# Patient Record
Sex: MALE | HISPANIC OR LATINO | Employment: OTHER | URBAN - NONMETROPOLITAN AREA
[De-identification: names, ages, dates, MRNs, and addresses within clinical notes are randomized per-mention and may not be internally consistent; named-entity substitution may affect disease eponyms.]

---

## 2017-12-28 ENCOUNTER — HOSPITAL ENCOUNTER (EMERGENCY)
Facility: HOSPITAL | Age: 57
Discharge: HOME | End: 2017-12-28
Attending: EMERGENCY MEDICINE | Admitting: EMERGENCY MEDICINE

## 2017-12-28 ENCOUNTER — APPOINTMENT (OUTPATIENT)
Dept: GENERAL RADIOLOGY | Facility: HOSPITAL | Age: 57
End: 2017-12-28

## 2017-12-28 ENCOUNTER — APPOINTMENT (OUTPATIENT)
Dept: CT IMAGING | Facility: HOSPITAL | Age: 57
End: 2017-12-28

## 2017-12-28 VITALS
WEIGHT: 235 LBS | OXYGEN SATURATION: 89 % | BODY MASS INDEX: 36.88 KG/M2 | RESPIRATION RATE: 20 BRPM | SYSTOLIC BLOOD PRESSURE: 144 MMHG | TEMPERATURE: 98.6 F | HEART RATE: 94 BPM | HEIGHT: 67 IN | DIASTOLIC BLOOD PRESSURE: 68 MMHG

## 2017-12-28 DIAGNOSIS — K43.9 ABDOMINAL WALL HERNIA: ICD-10-CM

## 2017-12-28 DIAGNOSIS — K94.09 COLOSTOMY PROLAPSE (HCC): Primary | ICD-10-CM

## 2017-12-28 LAB
ALBUMIN SERPL-MCNC: 3.8 G/DL (ref 3.4–4.8)
ALBUMIN/GLOB SERPL: 1.1 G/DL (ref 1.1–1.8)
ALP SERPL-CCNC: 93 U/L (ref 38–126)
ALT SERPL W P-5'-P-CCNC: 36 U/L (ref 21–72)
ANION GAP SERPL CALCULATED.3IONS-SCNC: 10 MMOL/L (ref 5–15)
AST SERPL-CCNC: 33 U/L (ref 17–59)
BASOPHILS # BLD AUTO: 0.01 10*3/MM3 (ref 0–0.2)
BASOPHILS NFR BLD AUTO: 0.2 % (ref 0–2)
BILIRUB SERPL-MCNC: 0.4 MG/DL (ref 0.2–1.3)
BILIRUB UR QL STRIP: NEGATIVE
BUN BLD-MCNC: 11 MG/DL (ref 7–21)
BUN/CREAT SERPL: 16.4 (ref 7–25)
CALCIUM SPEC-SCNC: 9 MG/DL (ref 8.4–10.2)
CHLORIDE SERPL-SCNC: 101 MMOL/L (ref 95–110)
CLARITY UR: CLEAR
CO2 SERPL-SCNC: 28 MMOL/L (ref 22–31)
COLOR UR: YELLOW
CREAT BLD-MCNC: 0.67 MG/DL (ref 0.7–1.3)
DEPRECATED RDW RBC AUTO: 49 FL (ref 35.1–43.9)
EOSINOPHIL # BLD AUTO: 0.09 10*3/MM3 (ref 0–0.7)
EOSINOPHIL NFR BLD AUTO: 2 % (ref 0–7)
ERYTHROCYTE [DISTWIDTH] IN BLOOD BY AUTOMATED COUNT: 15.1 % (ref 11.5–14.5)
GFR SERPL CREATININE-BSD FRML MDRD: 123 ML/MIN/1.73 (ref 56–130)
GFR SERPL CREATININE-BSD FRML MDRD: 149 ML/MIN/1.73 (ref 56–130)
GLOBULIN UR ELPH-MCNC: 3.5 GM/DL (ref 2.3–3.5)
GLUCOSE BLD-MCNC: 163 MG/DL (ref 60–100)
GLUCOSE UR STRIP-MCNC: NEGATIVE MG/DL
HCT VFR BLD AUTO: 30.9 % (ref 39–49)
HGB BLD-MCNC: 9.9 G/DL (ref 13.7–17.3)
HGB UR QL STRIP.AUTO: NEGATIVE
IMM GRANULOCYTES # BLD: 0.01 10*3/MM3 (ref 0–0.02)
IMM GRANULOCYTES NFR BLD: 0.2 % (ref 0–0.5)
KETONES UR QL STRIP: NEGATIVE
LEUKOCYTE ESTERASE UR QL STRIP.AUTO: NEGATIVE
LIPASE SERPL-CCNC: 50 U/L (ref 23–300)
LYMPHOCYTES # BLD AUTO: 1.65 10*3/MM3 (ref 0.6–4.2)
LYMPHOCYTES NFR BLD AUTO: 36.3 % (ref 10–50)
MAGNESIUM SERPL-MCNC: 1.7 MG/DL (ref 1.6–2.3)
MCH RBC QN AUTO: 28.4 PG (ref 26.5–34)
MCHC RBC AUTO-ENTMCNC: 32 G/DL (ref 31.5–36.3)
MCV RBC AUTO: 88.5 FL (ref 80–98)
MONOCYTES # BLD AUTO: 0.34 10*3/MM3 (ref 0–0.9)
MONOCYTES NFR BLD AUTO: 7.5 % (ref 0–12)
NEUTROPHILS # BLD AUTO: 2.45 10*3/MM3 (ref 2–8.6)
NEUTROPHILS NFR BLD AUTO: 53.8 % (ref 37–80)
NITRITE UR QL STRIP: NEGATIVE
PH UR STRIP.AUTO: 6.5 [PH] (ref 5–9)
PLATELET # BLD AUTO: 282 10*3/MM3 (ref 150–450)
PMV BLD AUTO: 10.1 FL (ref 8–12)
POTASSIUM BLD-SCNC: 3.9 MMOL/L (ref 3.5–5.1)
POTASSIUM BLD-SCNC: 4.5 MMOL/L (ref 3.5–5.1)
PROT SERPL-MCNC: 7.3 G/DL (ref 6.3–8.6)
PROT UR QL STRIP: NEGATIVE
RBC # BLD AUTO: 3.49 10*6/MM3 (ref 4.37–5.74)
SODIUM BLD-SCNC: 139 MMOL/L (ref 137–145)
SP GR UR STRIP: 1.01 (ref 1–1.03)
UROBILINOGEN UR QL STRIP: NORMAL
WBC NRBC COR # BLD: 4.55 10*3/MM3 (ref 3.2–9.8)

## 2017-12-28 PROCEDURE — 25010000002 ONDANSETRON PER 1 MG: Performed by: EMERGENCY MEDICINE

## 2017-12-28 PROCEDURE — 83690 ASSAY OF LIPASE: CPT | Performed by: EMERGENCY MEDICINE

## 2017-12-28 PROCEDURE — 0 IOPAMIDOL 61 % SOLUTION: Performed by: EMERGENCY MEDICINE

## 2017-12-28 PROCEDURE — 85025 COMPLETE CBC W/AUTO DIFF WBC: CPT | Performed by: EMERGENCY MEDICINE

## 2017-12-28 PROCEDURE — 84132 ASSAY OF SERUM POTASSIUM: CPT | Performed by: EMERGENCY MEDICINE

## 2017-12-28 PROCEDURE — 99284 EMERGENCY DEPT VISIT MOD MDM: CPT

## 2017-12-28 PROCEDURE — 83735 ASSAY OF MAGNESIUM: CPT | Performed by: EMERGENCY MEDICINE

## 2017-12-28 PROCEDURE — 96374 THER/PROPH/DIAG INJ IV PUSH: CPT

## 2017-12-28 PROCEDURE — 96376 TX/PRO/DX INJ SAME DRUG ADON: CPT

## 2017-12-28 PROCEDURE — 81003 URINALYSIS AUTO W/O SCOPE: CPT | Performed by: EMERGENCY MEDICINE

## 2017-12-28 PROCEDURE — 94640 AIRWAY INHALATION TREATMENT: CPT

## 2017-12-28 PROCEDURE — 96361 HYDRATE IV INFUSION ADD-ON: CPT

## 2017-12-28 PROCEDURE — 80053 COMPREHEN METABOLIC PANEL: CPT | Performed by: EMERGENCY MEDICINE

## 2017-12-28 PROCEDURE — 96375 TX/PRO/DX INJ NEW DRUG ADDON: CPT

## 2017-12-28 PROCEDURE — 74177 CT ABD & PELVIS W/CONTRAST: CPT

## 2017-12-28 PROCEDURE — 0 DIATRIZOATE MEGLUMINE & SODIUM PER 1 ML: Performed by: EMERGENCY MEDICINE

## 2017-12-28 PROCEDURE — 74022 RADEX COMPL AQT ABD SERIES: CPT

## 2017-12-28 PROCEDURE — 82962 GLUCOSE BLOOD TEST: CPT

## 2017-12-28 RX ORDER — INSULIN GLARGINE 100 [IU]/ML
15 INJECTION, SOLUTION SUBCUTANEOUS 2 TIMES DAILY
COMMUNITY

## 2017-12-28 RX ORDER — FLUTICASONE PROPIONATE 50 MCG
2 SPRAY, SUSPENSION (ML) NASAL DAILY
COMMUNITY

## 2017-12-28 RX ORDER — ALBUTEROL SULFATE 90 UG/1
2 AEROSOL, METERED RESPIRATORY (INHALATION) EVERY 4 HOURS PRN
COMMUNITY

## 2017-12-28 RX ORDER — PANTOPRAZOLE SODIUM 40 MG/1
40 TABLET, DELAYED RELEASE ORAL DAILY
COMMUNITY

## 2017-12-28 RX ORDER — TRAZODONE HYDROCHLORIDE 50 MG/1
50 TABLET ORAL NIGHTLY
COMMUNITY

## 2017-12-28 RX ORDER — ACETAMINOPHEN 325 MG/1
TABLET ORAL
Status: COMPLETED
Start: 2017-12-28 | End: 2017-12-28

## 2017-12-28 RX ORDER — ONDANSETRON 2 MG/ML
4 INJECTION INTRAMUSCULAR; INTRAVENOUS ONCE
Status: COMPLETED | OUTPATIENT
Start: 2017-12-28 | End: 2017-12-28

## 2017-12-28 RX ORDER — GABAPENTIN 800 MG/1
900 TABLET ORAL 3 TIMES DAILY
COMMUNITY

## 2017-12-28 RX ORDER — ACETAMINOPHEN 325 MG/1
650 TABLET ORAL ONCE
Status: COMPLETED | OUTPATIENT
Start: 2017-12-28 | End: 2017-12-28

## 2017-12-28 RX ORDER — SODIUM CHLORIDE 9 MG/ML
125 INJECTION, SOLUTION INTRAVENOUS CONTINUOUS
Status: DISCONTINUED | OUTPATIENT
Start: 2017-12-28 | End: 2017-12-29 | Stop reason: HOSPADM

## 2017-12-28 RX ORDER — OXYCODONE AND ACETAMINOPHEN 10; 325 MG/1; MG/1
1 TABLET ORAL EVERY 6 HOURS PRN
COMMUNITY

## 2017-12-28 RX ORDER — SUCRALFATE 1 G/1
1 TABLET ORAL 4 TIMES DAILY
COMMUNITY

## 2017-12-28 RX ORDER — LISINOPRIL 10 MG/1
10 TABLET ORAL DAILY
COMMUNITY

## 2017-12-28 RX ORDER — LOPERAMIDE HYDROCHLORIDE 2 MG/1
2 CAPSULE ORAL 4 TIMES DAILY PRN
COMMUNITY

## 2017-12-28 RX ORDER — MAGNESIUM SULFATE HEPTAHYDRATE 40 MG/ML
2 INJECTION, SOLUTION INTRAVENOUS ONCE
Status: DISCONTINUED | OUTPATIENT
Start: 2017-12-28 | End: 2017-12-28

## 2017-12-28 RX ORDER — IPRATROPIUM BROMIDE AND ALBUTEROL SULFATE 2.5; .5 MG/3ML; MG/3ML
3 SOLUTION RESPIRATORY (INHALATION) ONCE
Status: COMPLETED | OUTPATIENT
Start: 2017-12-28 | End: 2017-12-28

## 2017-12-28 RX ADMIN — SODIUM CHLORIDE 500 ML: 9 INJECTION, SOLUTION INTRAVENOUS at 18:35

## 2017-12-28 RX ADMIN — IPRATROPIUM BROMIDE AND ALBUTEROL SULFATE 3 ML: .5; 3 SOLUTION RESPIRATORY (INHALATION) at 17:07

## 2017-12-28 RX ADMIN — IOPAMIDOL 94 ML: 612 INJECTION, SOLUTION INTRAVENOUS at 20:25

## 2017-12-28 RX ADMIN — ACETAMINOPHEN 650 MG: 325 TABLET ORAL at 21:40

## 2017-12-28 RX ADMIN — ONDANSETRON 4 MG: 2 INJECTION INTRAMUSCULAR; INTRAVENOUS at 17:03

## 2017-12-28 RX ADMIN — DIATRIZOATE MEGLUMINE AND DIATRIZOATE SODIUM 40 ML: 660; 100 LIQUID ORAL; RECTAL at 18:09

## 2017-12-28 RX ADMIN — HYDROMORPHONE HYDROCHLORIDE 0.5 MG: 10 INJECTION, SOLUTION INTRAMUSCULAR; INTRAVENOUS; SUBCUTANEOUS at 21:29

## 2017-12-28 RX ADMIN — HYDROMORPHONE HYDROCHLORIDE 0.5 MG: 10 INJECTION, SOLUTION INTRAMUSCULAR; INTRAVENOUS; SUBCUTANEOUS at 17:03

## 2017-12-28 RX ADMIN — SODIUM CHLORIDE 125 ML/HR: 9 INJECTION, SOLUTION INTRAVENOUS at 17:03

## 2017-12-29 ENCOUNTER — TELEPHONE (OUTPATIENT)
Dept: FAMILY MEDICINE CLINIC | Facility: CLINIC | Age: 57
End: 2017-12-29

## 2017-12-29 LAB — GLUCOSE BLDC GLUCOMTR-MCNC: 105 MG/DL (ref 70–130)

## 2018-01-05 ENCOUNTER — OFFICE VISIT (OUTPATIENT)
Dept: SURGERY | Facility: CLINIC | Age: 58
End: 2018-01-05

## 2018-01-05 VITALS
HEIGHT: 67 IN | SYSTOLIC BLOOD PRESSURE: 134 MMHG | WEIGHT: 233 LBS | DIASTOLIC BLOOD PRESSURE: 74 MMHG | BODY MASS INDEX: 36.57 KG/M2

## 2018-01-05 DIAGNOSIS — K43.5 PARASTOMAL HERNIA WITHOUT OBSTRUCTION OR GANGRENE: ICD-10-CM

## 2018-01-05 DIAGNOSIS — K43.2 INCISIONAL HERNIA, WITHOUT OBSTRUCTION OR GANGRENE: ICD-10-CM

## 2018-01-05 DIAGNOSIS — C19 COLORECTAL CANCER (HCC): Primary | ICD-10-CM

## 2018-01-05 PROCEDURE — 99214 OFFICE O/P EST MOD 30 MIN: CPT | Performed by: SURGERY

## 2018-01-05 RX ORDER — CLONAZEPAM 2 MG/1
TABLET ORAL
Refills: 1 | COMMUNITY
Start: 2017-12-30

## 2018-01-05 NOTE — PROGRESS NOTES
Subjective   Pierre Pena is a 57 y.o. male     History of Present Illness patient's here in follow-up after I saw him in the emergency room for concern about a herniated loop ostomy.  We were able to reduce the hernia in the emergency room and there is no evidence of any injury incarceration or obstruction.  Patient has a very complicated course and after my interaction with emergency room he had some type of surgery urgently in [place] in November of this past year.  Today its unclear if that was for a bowel obstruction due to cancer or if that was surgery on a liver met.  The patient and his daughter are not able to tell me and there is no paperwork available to explain what happened.  Patient had a CT scan done at her emergency room which demonstrates a hernia at the stoma was small bowel present but again no obvious obstruction present and also has a hernia in the right lower abdomen that is a site of a prior colostomy per the patient and his daughter.  The patient tells me that they removed his tumor from his colon in that they also removed tumor from his liver.  He has a mass in his liver that is either resolving hematoma or metastatic disease no other evidence of metastatic disease.  Patient was on chemotherapy prior to coming here from [place].  I asked the patient last saw him in the emergency room and his family to bring any paperwork with him if he sees any physician to include, and the see me.  They brought a disc of the CT scan was done in November in [place] and a discharge summary which does not explain any type of surgery that the patient had.  The diagnosis are colorectal cancer and liver metastases according to this report.  Patient's main complaint is pain and he points to his entire anterior abdominal wall.  He says that he is not able to eat because of this.  He had been getting his pain prescriptions from [place] but is not able to get those anymore.  After seeing me in the emergency room  he did find a primary care provider near [place] where he lives with his family.  According to the patient and his daughter this physician is going get him set up to see a medical oncologist locally patient tells me that he has a folder about his medical care but did not bring it to this appointment.  Daughter served as  as this patient does not speak English she speaks excellent English and Dutch        Review of Systems   Constitutional: Positive for appetite change.   HENT: Positive for nosebleeds.    Eyes: Negative.    Respiratory: Positive for cough, choking and shortness of breath.    Cardiovascular: Positive for palpitations and leg swelling.   Gastrointestinal: Positive for abdominal pain, blood in stool and diarrhea.   Endocrine: Negative.    Genitourinary: Positive for difficulty urinating.   Musculoskeletal: Positive for back pain.   Skin: Negative.    Allergic/Immunologic: Negative.    Neurological: Positive for dizziness and numbness.        Numbness & Tingling Bilateral Hands,Legs and Feet   Hematological: Negative.    Psychiatric/Behavioral:        Mood Changes,Depression     Past Medical History:   Diagnosis Date   • Cardiac defibrillator in place    • Colon cancer    • COPD (chronic obstructive pulmonary disease)    • Diabetes mellitus    • GERD (gastroesophageal reflux disease)    • H/O hernia repair    • Hypertension    • Insomnia    • Kidney stone    • Liver cancer    • Pacemaker    • Sleep apnea      Past Surgical History:   Procedure Laterality Date   • COLON SURGERY     • LIVER LOBECTOMY     • TRACHEOSTOMY       No family history on file.  Social History     Social History   • Marital status:      Spouse name: N/A   • Number of children: N/A   • Years of education: N/A     Occupational History   • Not on file.     Social History Main Topics   • Smoking status: Never Smoker   • Smokeless tobacco: Never Used   • Alcohol use No   • Drug use: No   • Sexual activity: Defer      Other Topics Concern   • Not on file     Social History Narrative     Allergies   Allergen Reactions   • Penicillins        Home Medications:  Prior to Admission medications    Medication Sig Start Date End Date Taking? Authorizing Provider   albuterol (PROVENTIL HFA;VENTOLIN HFA) 108 (90 Base) MCG/ACT inhaler Inhale 2 puffs Every 4 (Four) Hours As Needed for Wheezing.   Yes Historical Provider, MD   clonazePAM (KlonoPIN) 2 MG tablet TAKE 1 TABLET PO HS 12/30/17  Yes Historical Provider, MD   exenatide (BYETTA 10 MCG PEN) 10 MCG/0.04ML injection Inject 10 mcg under the skin 2 (Two) Times a Day.   Yes Historical Provider, MD   fluticasone (FLONASE) 50 MCG/ACT nasal spray 2 sprays into each nostril Daily.   Yes Historical Provider, MD   fluticasone-salmeterol (ADVAIR) 100-50 MCG/DOSE DISKUS Inhale 2 (Two) Times a Day.   Yes Historical Provider, MD   gabapentin (NEURONTIN) 800 MG tablet Take 900 mg by mouth 3 (Three) Times a Day.   Yes Historical Provider, MD   insulin glargine (LANTUS) 100 UNIT/ML injection Inject 15 Units under the skin 2 (Two) Times a Day.   Yes Historical Provider, MD   insulin lispro protamine-insulin lispro (humaLOG 50-50) (50-50) 100 UNIT/ML suspension injection Inject  under the skin 2 (Two) Times a Day With Meals. Unknown if 50/50 or 75/25   Yes Historical Provider, MD   lisinopril (PRINIVIL,ZESTRIL) 10 MG tablet Take 10 mg by mouth Daily.   Yes Historical Provider, MD   loperamide (IMODIUM) 2 MG capsule Take 2 mg by mouth 4 (Four) Times a Day As Needed for Diarrhea.   Yes Historical Provider, MD   metFORMIN (GLUCOPHAGE) 500 MG tablet Take 500 mg by mouth 2 (Two) Times a Day With Meals.   Yes Historical Provider, MD   metoprolol tartrate (LOPRESSOR) 25 MG tablet Take 25 mg by mouth 2 (Two) Times a Day.   Yes Historical Provider, MD   oxyCODONE-acetaminophen (PERCOCET)  MG per tablet Take 1 tablet by mouth Every 6 (Six) Hours As Needed for Moderate Pain .   Yes Historical Provider, MD    pantoprazole (PROTONIX) 40 MG EC tablet Take 40 mg by mouth Daily.   Yes Historical Provider, MD   sucralfate (CARAFATE) 1 g tablet Take 1 g by mouth 4 (Four) Times a Day.   Yes Historical Provider, MD   tiotropium (SPIRIVA) 18 MCG per inhalation capsule Place 1 capsule into inhaler and inhale Daily.   Yes Historical Provider, MD   traZODone (DESYREL) 50 MG tablet Take 50 mg by mouth Every Night.   Yes Historical Provider, MD       Objective   Physical Exam   Constitutional: No distress.   HENT:   Head: Normocephalic and atraumatic.   Eyes: EOM are normal. Pupils are equal, round, and reactive to light. No scleral icterus.   Neck: Normal range of motion. No tracheal deviation present.   Cardiovascular: Normal rate and regular rhythm.    Pulmonary/Chest: Effort normal and breath sounds normal. No stridor.   Abdominal: Soft. Bowel sounds are normal. He exhibits no distension. There is no guarding. A hernia is present.   Skin: Skin is warm and dry.    morbidly obese male with a parastomal hernia the table to be reduced and a hernia to the right of midline in the lower aspect of his abdomen that it too is able to be reduced well-healed scar overlying the second hernia.  Ostomy is pink and clearly working    Assessment/Plan parastomal hernia and a second separate hernia his anterior abdominal wall with likely metastatic colorectal cancer.  Again I explained to the patient and his family the complexity of his situation and the fact that in order for us to even attempt to sort this out we need information from his previous care to include what operations he had with chemotherapy regimen he was on,  pathology etc. I can not give this patient pain medication he will need to get that from his primary care provider.  She will see his primary care provider for referral to medical oncology and likely surgical therapy closer to his home.      The primary encounter diagnosis was Colorectal cancer. Diagnoses of Incisional hernia,  without obstruction or gangrene and Parastomal hernia without obstruction or gangrene were also pertinent to this visit.                     This document has been electronically signed by Hernan Rebolledo MD on January 5, 2018 5:04 PM